# Patient Record
Sex: FEMALE | Race: WHITE | NOT HISPANIC OR LATINO | Employment: UNEMPLOYED | URBAN - METROPOLITAN AREA
[De-identification: names, ages, dates, MRNs, and addresses within clinical notes are randomized per-mention and may not be internally consistent; named-entity substitution may affect disease eponyms.]

---
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
## 2023-08-08 ENCOUNTER — TELEPHONE (OUTPATIENT)
Age: 23
End: 2023-08-08

## 2023-08-08 NOTE — TELEPHONE ENCOUNTER
Caller:  Beto at [facility]    Doctor: ortho    Reason for call:     Checking if  Referral received.  Confirmed yes    Call back#: n/a

## 2023-08-09 ENCOUNTER — OFFICE VISIT (OUTPATIENT)
Dept: OBGYN CLINIC | Facility: CLINIC | Age: 23
End: 2023-08-09
Payer: OTHER MISCELLANEOUS

## 2023-08-09 ENCOUNTER — HOSPITAL ENCOUNTER (OUTPATIENT)
Dept: RADIOLOGY | Facility: HOSPITAL | Age: 23
Discharge: HOME | End: 2023-08-09
Attending: ORTHOPAEDIC SURGERY
Payer: COMMERCIAL

## 2023-08-09 ENCOUNTER — TELEPHONE (OUTPATIENT)
Age: 23
End: 2023-08-09

## 2023-08-09 VITALS — BODY MASS INDEX: 25.02 KG/M2 | WEIGHT: 159.4 LBS | HEIGHT: 67 IN

## 2023-08-09 DIAGNOSIS — S60.212A CONTUSION OF LEFT WRIST, INITIAL ENCOUNTER: Primary | ICD-10-CM

## 2023-08-09 DIAGNOSIS — M25.532 PAIN IN LEFT WRIST: ICD-10-CM

## 2023-08-09 PROCEDURE — 99203 OFFICE O/P NEW LOW 30 MIN: CPT | Performed by: ORTHOPAEDIC SURGERY

## 2023-08-09 PROCEDURE — 73110 X-RAY EXAM OF WRIST: CPT

## 2023-08-09 NOTE — LETTER
August 9, 2023     Patient: Charlene Hough  YOB: 2000  Date of Visit: 8/9/2023      To Whom it May Concern:    Charlene Hough is under my professional care. Tiesha Thomas was seen in my office on 8/9/2023. Tiesha Thomas may return to work on 8/14. If you have any questions or concerns, please don't hesitate to call.          Sincerely,          Emma Quesada        CC: No Recipients

## 2023-08-09 NOTE — PROGRESS NOTES
[address]  [address]  [facility] [place] [address]  [address]  [id number]  2000    ORTHOPAEDIC SURGERY OUTPATIENT NOTE  8/9/2023      HISTORY:  23 y.o. female  Presents for initial evaluation of her left wrist. She reports 4 days ago she had a collapsible ladder fall onto her hand and wrist. 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 This is a work related injury. [facility] and told she had a fracture and was placed in a splint. She denies numbness or tingling in the hand. History reviewed. No pertinent past medical history. History reviewed. No pertinent surgical history. Social History     Socioeconomic History   • Marital status: Single     Spouse name: Not on file   • Number of children: Not on file   • Years of education: Not on file   • Highest education level: Not on file   Occupational History   • Not on file   Tobacco Use   • Smoking status: Never   • Smokeless tobacco: Never   Vaping Use   • Vaping Use: Never used   Substance and Sexual Activity   • Alcohol use: Never   • Drug use: Never   • Sexual activity: Not on file   Other Topics Concern   • Not on file   Social History Narrative   • Not on file     Social Determinants of Health     Financial Resource Strain: Not on file   Food Insecurity: Not on file   Transportation Needs: Not on file   Physical Activity: Not on file   Stress: Not on file   Social Connections: Not on file   Intimate Partner Violence: Not on file   Housing Stability: Not on file       History reviewed. No pertinent family history.      Patient's Medications   New Prescriptions    No medications on file   Previous Medications    CETIRIZINE-PSEUDOEPHEDRINE (ZYRTEC-D) 5-120 MG PER TABLET    Take 1 tablet by mouth 2 (two) times a day    CLINDAMYCIN-BENZOYL PEROXIDE (BENZACLIN) GEL    1 (ONE) APPLICATION DAILY    DOXYCYCLINE HYCLATE (VIBRA-TABS) 100 MG TABLET    Take 100 mg by mouth daily    FLUTICASONE (FLONASE) 50 MCG/ACT NASAL SPRAY    2 sprays into each nostril daily    JUNEL FE 1/20 1-20 MG-MCG PER TABLET        TRETINOIN (RETIN-A) 0.01 % GEL    1 (ONE) APPLICATION AT BEDTIME   Modified Medications    No medications on file   Discontinued Medications    No medications on file       No Known Allergies     Ht 5' 7" (1.702 m) Comment: verbal  Wt 72.3 kg (159 lb 6.4 oz)   BMI 24.97 kg/m²      REVIEW OF SYSTEMS:  Constitutional: Negative. HEENT: Negative. Respiratory: Negative. Skin: Negative. Neurological: Negative. Psychiatric/Behavioral: Negative. Musculoskeletal: Negative except for that mentioned in the HPI. Ht 5' 7" (1.702 m) Comment: verbal  Wt 72.3 kg (159 lb 6.4 oz)   BMI 24.97 kg/m²   Gen: No acute distress, resting comfortably in bed  HEENT: Eyes clear, moist mucus membranes, hearing intact  Respiratory: No audible wheezing or stridor  Cardiovascular: Well Perfused peripherally, 2+ distal pulse  Abdomen: nondistended, no peritoneal signs     PHYSICAL EXAM:    Left wrist:  TTP Across the dorsum of the wrist  TTP distal radius  Wrist ROM:  Flexion 50  Extension 30  Supination and pronation full     strength intact. IMAGING: X-ray of the left wrist was taken in the office today. This was reviewed and demonstrates no acute fracture or subluxation. No significant degenerative changes. ASSESSMENT AND PLAN:  23 y.o. female with left wrist contusion. This is a work-related injury. She will be placed in a cock up wrist brace. Imaging and exam findings were discussed with the patient. She will return to work next week. There was a referral for hand therapy placed if she has stiffness and needs to work on range of motion. Will see her back as needed.     Scribe Attestation      I,:  Kailash Sprague PA-C am acting as a scribe while in the presence of the attending physician.:       I,:  Emma Quesada personally performed the services described in this documentation    as scribed in my presence.:

## 2023-08-09 NOTE — TELEPHONE ENCOUNTER
Caller: [email or web address]    Doctor: Chet Daniel    Reason for call: called to confirm if patient was seen    Call back#: n/a

## 2023-08-09 NOTE — TELEPHONE ENCOUNTER
Employer Name: [employer]  Address: [place] [id number]  Phone number: [phone]    [address]: [facility]   Address: [address] [place] [address]  Phone number: [phone]    [facility] Name: Keegan Richardson  Phone number: [phone] ext [phone]  Fax number: [phone]    Claim #: [id number]  DOI: 8/5/23  Location of injury: Left wrist

## 2024-01-23 ENCOUNTER — OFFICE VISIT (OUTPATIENT)
Dept: URGENT CARE | Facility: CLINIC | Age: 24
End: 2024-01-23
Payer: COMMERCIAL

## 2024-01-23 VITALS
OXYGEN SATURATION: 100 % | RESPIRATION RATE: 18 BRPM | HEART RATE: 94 BPM | DIASTOLIC BLOOD PRESSURE: 72 MMHG | SYSTOLIC BLOOD PRESSURE: 114 MMHG | TEMPERATURE: 97.1 F

## 2024-01-23 DIAGNOSIS — R31.9 URINARY TRACT INFECTION WITH HEMATURIA, SITE UNSPECIFIED: Primary | ICD-10-CM

## 2024-01-23 DIAGNOSIS — N39.0 URINARY TRACT INFECTION WITH HEMATURIA, SITE UNSPECIFIED: Primary | ICD-10-CM

## 2024-01-23 LAB
SL AMB  POCT GLUCOSE, UA: NEGATIVE
SL AMB LEUKOCYTE ESTERASE,UA: ABNORMAL
SL AMB POCT BILIRUBIN,UA: NEGATIVE
SL AMB POCT BLOOD,UA: ABNORMAL
SL AMB POCT CLARITY,UA: CLEAR
SL AMB POCT COLOR,UA: YELLOW
SL AMB POCT KETONES,UA: NEGATIVE
SL AMB POCT NITRITE,UA: NEGATIVE
SL AMB POCT PH,UA: 6
SL AMB POCT SPECIFIC GRAVITY,UA: 1.01
SL AMB POCT URINE HCG: NEGATIVE
SL AMB POCT URINE PROTEIN: ABNORMAL
SL AMB POCT UROBILINOGEN: 0.2

## 2024-01-23 PROCEDURE — 87086 URINE CULTURE/COLONY COUNT: CPT | Performed by: PHYSICIAN ASSISTANT

## 2024-01-23 PROCEDURE — 87186 SC STD MICRODIL/AGAR DIL: CPT | Performed by: PHYSICIAN ASSISTANT

## 2024-01-23 PROCEDURE — 87077 CULTURE AEROBIC IDENTIFY: CPT | Performed by: PHYSICIAN ASSISTANT

## 2024-01-23 PROCEDURE — 81002 URINALYSIS NONAUTO W/O SCOPE: CPT | Performed by: PHYSICIAN ASSISTANT

## 2024-01-23 PROCEDURE — 99213 OFFICE O/P EST LOW 20 MIN: CPT | Performed by: PHYSICIAN ASSISTANT

## 2024-01-23 PROCEDURE — 81025 URINE PREGNANCY TEST: CPT | Performed by: PHYSICIAN ASSISTANT

## 2024-01-23 RX ORDER — DROSPIRENONE AND ETHINYL ESTRADIOL 0.02-3(28)
KIT ORAL EVERY 24 HOURS
COMMUNITY

## 2024-01-23 RX ORDER — CEPHALEXIN 500 MG/1
500 CAPSULE ORAL EVERY 12 HOURS SCHEDULED
Qty: 10 CAPSULE | Refills: 0 | Status: SHIPPED | OUTPATIENT
Start: 2024-01-23 | End: 2024-01-28

## 2024-01-23 RX ORDER — FLUOXETINE 10 MG/1
10 CAPSULE ORAL DAILY
COMMUNITY

## 2024-01-23 NOTE — PATIENT INSTRUCTIONS
Take antibiotic as directed.    Push fluids.    If burning on urination, you may try over the counter Azo Urinary Pain Relief or comparable product.  Please note that this type of product may cause your urine to become bright orange and it may stain your undergarments if you leak.  Please wear protection as needed.      Schedule follow up with your PCP if not improving over next 3-5 days.    If you have recurrent urinary tract problems, please follow up with your PCP and / or urologist for further evaluation.      If you develop worsening symptoms with associated fever, back pain, abdominal pain, nausea with vomiting, please proceed to emergency room for further evaluation.

## 2024-01-23 NOTE — PROGRESS NOTES
[facility]    NAME: Dinora Huertas is a 23 y.o. female  : 2000    MRN: [id number]  DATE: 2024  TIME: 7:32 AM    Assessment and Plan   Urinary tract infection with hematuria, site unspecified [N39.0, R31.9]  1. Urinary tract infection with hematuria, site unspecified  POCT urine dip    POCT urine HCG    Urine culture    cephalexin (KEFLEX) 500 mg capsule          Patient Instructions     Patient Instructions   Take antibiotic as directed.    Push fluids.    If burning on urination, you may try over the counter Azo Urinary Pain Relief or comparable product.  Please note that this type of product may cause your urine to become bright orange and it may stain your undergarments if you leak.  Please wear protection as needed.      Schedule follow up with your PCP if not improving over next 3-5 days.    If you have recurrent urinary tract problems, please follow up with your PCP and / or urologist for further evaluation.      If you develop worsening symptoms with associated fever, back pain, abdominal pain, nausea with vomiting, please proceed to emergency room for further evaluation.           Chief Complaint     Chief Complaint   Patient presents with    Possible UTI     Patient states that she woke up this am with blood in urine and dysuria with bilat flank pain       History of Present Illness   Dinora Huertas presents to the clinic c/o  23-year-old female comes in with complaint of burning on urination.    Started: Patient woke up this morning with low back pressure, suprapubic pressure and burning on urination with frequency.  When wiping she noted little bit of blood.  Associated signs and symptoms: Initially nauseated but that has passed.  No fever or chills.  No flank pain.  Modifying factors: Came here for further evaluation.    Will be leaving this next Monday to study in [employer] for the ester.  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      No prior history of urinary tract  infections.    She is sexually active.  Denies any abnormal vaginal discharge, vaginal pain.          Review of Systems   Review of Systems   Constitutional: Negative.    Respiratory: Negative.     Cardiovascular: Negative.    Gastrointestinal:  Positive for abdominal pain and nausea. Negative for vomiting.        Nauseated earlier this morning but that is passed.   Genitourinary:  Positive for difficulty urinating, dysuria, frequency, hematuria and urgency. Negative for flank pain, genital sores, vaginal bleeding, vaginal discharge and vaginal pain.       Current Medications     Long-Term Medications   Medication Sig Dispense Refill    clindamycin-benzoyl peroxide (BENZACLIN) gel 1 (ONE) APPLICATION DAILY (Patient not taking: No sig reported)  2    drospirenone-ethinyl estradiol (CHARLES) 3-0.02 MG per tablet every 24 hours (Patient not taking: Reported on 1/23/2024)      FLUoxetine (PROzac) 10 mg capsule Take 10 mg by mouth daily      fluticasone (FLONASE) 50 mcg/act nasal spray 2 sprays into each nostril daily 16 g 0    Junel FE 1/20 1-20 MG-MCG per tablet  (Patient not taking: Reported on 1/23/2024)      tretinoin (RETIN-A) 0.01 % gel 1 (ONE) APPLICATION AT BEDTIME (Patient not taking: No sig reported)  3       Current Allergies     Allergies as of 01/23/2024    (No Known Allergies)          The following portions of the patient's history were reviewed and updated as appropriate: allergies, current medications, past family history, past medical history, past social history, past surgical history and problem list.  History reviewed. No pertinent past medical history.  History reviewed. No pertinent surgical history.  History reviewed. No pertinent family history.    Objective   /72   Pulse 94   Temp (!) 97.1 °F (36.2 °C) (Tympanic)   Resp 18   LMP 12/14/2023   SpO2 100%   Patient's last menstrual period was 12/14/2023.       Physical Exam     Physical Exam  Vitals and nursing note reviewed.    Constitutional:       General: She is not in acute distress.     Appearance: She is well-developed. She is not ill-appearing, toxic-appearing or diaphoretic.   Cardiovascular:      Rate and Rhythm: Normal rate and regular rhythm.      Heart sounds: Normal heart sounds. No murmur heard.     No friction rub. No gallop.   Pulmonary:      Effort: Pulmonary effort is normal. No respiratory distress.      Breath sounds: Normal breath sounds. No stridor. No wheezing, rhonchi or rales.   Abdominal:      Palpations: Abdomen is soft.      Tenderness: There is abdominal tenderness. There is no right CVA tenderness, left CVA tenderness, guarding or rebound.      Comments: Suprapubic TTP   Skin:     General: Skin is warm and dry.   Neurological:      General: No focal deficit present.      Mental Status: She is alert and oriented to person, place, and time.   Psychiatric:         Mood and Affect: Mood normal.         Behavior: Behavior normal.

## 2024-01-25 LAB
BACTERIA UR CULT: ABNORMAL
BACTERIA UR CULT: ABNORMAL

## 2024-05-20 ENCOUNTER — OFFICE VISIT (OUTPATIENT)
Dept: URGENT CARE | Facility: CLINIC | Age: 24
End: 2024-05-20
Payer: COMMERCIAL

## 2024-05-20 VITALS
DIASTOLIC BLOOD PRESSURE: 66 MMHG | WEIGHT: 170 LBS | SYSTOLIC BLOOD PRESSURE: 112 MMHG | RESPIRATION RATE: 18 BRPM | BODY MASS INDEX: 26.63 KG/M2 | HEART RATE: 94 BPM | OXYGEN SATURATION: 99 % | TEMPERATURE: 97.7 F

## 2024-05-20 DIAGNOSIS — R30.0 DYSURIA: Primary | ICD-10-CM

## 2024-05-20 DIAGNOSIS — N30.01 ACUTE CYSTITIS WITH HEMATURIA: ICD-10-CM

## 2024-05-20 LAB
SL AMB  POCT GLUCOSE, UA: ABNORMAL
SL AMB LEUKOCYTE ESTERASE,UA: ABNORMAL
SL AMB POCT BILIRUBIN,UA: ABNORMAL
SL AMB POCT BLOOD,UA: ABNORMAL
SL AMB POCT CLARITY,UA: ABNORMAL
SL AMB POCT COLOR,UA: ABNORMAL
SL AMB POCT KETONES,UA: ABNORMAL
SL AMB POCT NITRITE,UA: NEGATIVE
SL AMB POCT PH,UA: 6
SL AMB POCT SPECIFIC GRAVITY,UA: 1.03
SL AMB POCT URINE HCG: NEGATIVE
SL AMB POCT URINE PROTEIN: ABNORMAL
SL AMB POCT UROBILINOGEN: 0.2

## 2024-05-20 PROCEDURE — 81025 URINE PREGNANCY TEST: CPT | Performed by: NURSE PRACTITIONER

## 2024-05-20 PROCEDURE — G0382 LEV 3 HOSP TYPE B ED VISIT: HCPCS | Performed by: NURSE PRACTITIONER

## 2024-05-20 PROCEDURE — S9083 URGENT CARE CENTER GLOBAL: HCPCS | Performed by: NURSE PRACTITIONER

## 2024-05-20 PROCEDURE — 81002 URINALYSIS NONAUTO W/O SCOPE: CPT | Performed by: NURSE PRACTITIONER

## 2024-05-20 PROCEDURE — 87086 URINE CULTURE/COLONY COUNT: CPT | Performed by: NURSE PRACTITIONER

## 2024-05-20 PROCEDURE — 87077 CULTURE AEROBIC IDENTIFY: CPT | Performed by: NURSE PRACTITIONER

## 2024-05-20 PROCEDURE — 87186 SC STD MICRODIL/AGAR DIL: CPT | Performed by: NURSE PRACTITIONER

## 2024-05-20 RX ORDER — NITROFURANTOIN 25; 75 MG/1; MG/1
100 CAPSULE ORAL 2 TIMES DAILY
Qty: 14 CAPSULE | Refills: 0 | Status: SHIPPED | OUTPATIENT
Start: 2024-05-20 | End: 2024-05-27

## 2024-05-20 NOTE — PATIENT INSTRUCTIONS
Urinary Tract Infection in Women   AMBULATORY CARE:   A urinary tract infection (UTI)  is caused by bacteria that get inside your urinary tract. Your urinary tract includes your kidneys, ureters, bladder, and urethra. A UTI is more common in your lower urinary tract, which includes your bladder and urethra.       Common symptoms include the following:   Urinating more often or waking from sleep to urinate    Pain or burning when you urinate    Pain or pressure in your lower abdomen and back    Urine that smells bad    Blood in your urine    Leaking urine    Seek care immediately if:   You are urinating very little or not at all.    You have a high fever with shaking chills.    You have side or back pain that gets worse.    Call your doctor if:   You have a fever.    You do not feel better after 2 days of taking antibiotics.    You have new symptoms, such as blood or pus in your urine.    You are vomiting.    You have questions or concerns about your condition or care.    Treatment for a UTI  may include antibiotics to treat a bacterial infection. You may also need medicines to decrease pain and burning, or decrease the urge to urinate often. If you have UTIs often (called recurrent UTIs), you may be given antibiotics to take regularly. You will be given directions for when and how to use antibiotics. The goal is to prevent UTIs but not cause antibiotic resistance by using antibiotics too often.  Prevent a UTI:   Empty your bladder often.  Urinate and empty your bladder as soon as you feel the need. Do not hold your urine for long periods of time.    Wipe from front to back after you urinate or have a bowel movement.  This will help prevent germs from getting into your urinary tract through your urethra.    Drink liquids as directed.  Ask how much liquid to drink each day and which liquids are best for you. You may need to drink more liquids than usual to help flush out the bacteria. Do not drink alcohol, caffeine,  or citrus juices. These can irritate your bladder and increase your symptoms. Your healthcare provider may recommend cranberry juice to help prevent a UTI.    Urinate before and after you have sex.  This can help flush out bacteria passed during sex.    Do not douche or use feminine deodorants.  These can change the chemical balance in your vagina.    Change sanitary pads or tampons often.  This will help prevent germs from getting into your urinary tract.    Talk to your healthcare provider about your birth control method.  You may need to change your method if it is increasing your risk for UTIs.    Wear cotton underwear and clothes that are loose.  Tight pants and nylon underwear can trap moisture and cause bacteria to grow.    Vaginal estrogen may be recommended.  This medicine helps prevent UTIs in women who have gone through menopause or are in scarlet-menopause.    Do pelvic muscle exercises often.  Pelvic muscle exercises may help you start and stop urinating. Strong pelvic muscles may help you empty your bladder easier. Squeeze these muscles tightly for 5 seconds like you are trying to hold back urine. Then relax for 5 seconds. Gradually work up to squeezing for 10 seconds. Do 3 sets of 15 repetitions a day, or as directed.    Follow up with your doctor as directed:  Write down your questions so you remember to ask them during your visits.  © Copyright [employer] 2023 Information is for End User's use only and may not be sold, redistributed or otherwise used for commercial purposes.  The above information is an  only. It is not intended as medical advice for individual conditions or treatments. Talk to your doctor, nurse or pharmacist before following any medical regimen to see if it is safe and effective for you.

## 2024-05-20 NOTE — PROGRESS NOTES
[facility]        NAME: Dinora Huertas is a 23 y.o. female  : 2000    MRN: [id number]  DATE: May 20, 2024  TIME: 2:46 PM    Assessment and Plan   Dysuria [R30.0]  1. Dysuria  POCT urine dip    POCT urine HCG    Urine culture    Urine culture    nitrofurantoin (MACROBID) 100 mg capsule      2. Acute cystitis with hematuria          Ua pos for blood and leuks   Will send culture for sensitivity   Start course of macrobid   F/u with pcp   Pt in agreement with plan     Patient Instructions     Follow up with PCP in 3-5 days.  Proceed to  ER if symptoms worsen.    Chief Complaint     Chief Complaint   Patient presents with    Possible UTI     Started 2 days ago with blood in urine, dysuria, lower abd discomfort         History of Present Illness   Dinora Huertas presents to the clinic c/o    Possible UTI (Started 2 days ago with blood in urine, dysuria, lower abd discomfort)  Started otc pyridium for symptom management   Does try to void after relations everytime.        Review of Systems   Review of Systems   All other systems reviewed and are negative.        Current Medications     Long-Term Medications   Medication Sig Dispense Refill    FLUoxetine (PROzac) 10 mg capsule Take 10 mg by mouth daily      clindamycin-benzoyl peroxide (BENZACLIN) gel 1 (ONE) APPLICATION DAILY (Patient not taking: No sig reported)  2    drospirenone-ethinyl estradiol (CHARLES) 3-0.02 MG per tablet every 24 hours (Patient not taking: Reported on 2024)      fluticasone (FLONASE) 50 mcg/act nasal spray 2 sprays into each nostril daily 16 g 0    Junel FE  1-20 MG-MCG per tablet  (Patient not taking: Reported on 2024)      tretinoin (RETIN-A) 0.01 % gel 1 (ONE) APPLICATION AT BEDTIME (Patient not taking: No sig reported)  3       Current Allergies     Allergies as of 2024    (No Known Allergies)            The following portions of the patient's history were reviewed and updated as appropriate:  allergies, current medications, past family history, past medical history, past social history, past surgical history and problem list.    Objective   /66   Pulse 94   Temp 97.7 °F (36.5 °C) (Tympanic)   Resp 18   Wt 77.1 kg (170 lb)   LMP 05/13/2024 (Approximate)   SpO2 99%   BMI 26.63 kg/m²        Physical Exam     Physical Exam  Vitals and nursing note reviewed.   Constitutional:       Appearance: Normal appearance. She is well-developed.   HENT:      Head: Normocephalic and atraumatic.      Right Ear: Hearing normal.      Left Ear: Hearing normal.      Nose: Nose normal.      Mouth/Throat:      Lips: Pink.      Mouth: Mucous membranes are moist.      Pharynx: Oropharynx is clear.   Eyes:      General: Lids are normal.      Conjunctiva/sclera: Conjunctivae normal.      Pupils: Pupils are equal, round, and reactive to light.   Cardiovascular:      Rate and Rhythm: Normal rate and regular rhythm.      Heart sounds: Normal heart sounds, S1 normal and S2 normal.   Pulmonary:      Effort: Pulmonary effort is normal.      Breath sounds: Normal breath sounds.   Abdominal:      General: Abdomen is flat. Bowel sounds are normal.      Palpations: Abdomen is soft.      Tenderness: There is abdominal tenderness in the suprapubic area. There is no right CVA tenderness or left CVA tenderness.   Musculoskeletal:         General: Normal range of motion.      Cervical back: Full passive range of motion without pain, normal range of motion and neck supple.   Skin:     General: Skin is warm and dry.   Neurological:      General: No focal deficit present.      Mental Status: She is alert and oriented to person, place, and time.   Psychiatric:         Mood and Affect: Mood normal.         Speech: Speech normal.         Behavior: Behavior normal. Behavior is cooperative.         Thought Content: Thought content normal.         Judgment: Judgment normal.

## 2024-05-22 LAB
BACTERIA UR CULT: ABNORMAL
BACTERIA UR CULT: ABNORMAL

## 2025-06-27 ENCOUNTER — APPOINTMENT (OUTPATIENT)
Dept: LAB | Facility: CLINIC | Age: 25
End: 2025-06-27
Payer: COMMERCIAL

## 2025-06-27 DIAGNOSIS — N92.0 EXCESSIVE OR FREQUENT MENSTRUATION: ICD-10-CM

## 2025-06-27 LAB
BASOPHILS # BLD AUTO: 0.07 THOUSANDS/ÂΜL (ref 0–0.1)
BASOPHILS NFR BLD AUTO: 1 % (ref 0–1)
EOSINOPHIL # BLD AUTO: 0.1 THOUSAND/ÂΜL (ref 0–0.61)
EOSINOPHIL NFR BLD AUTO: 2 % (ref 0–6)
ERYTHROCYTE [DISTWIDTH] IN BLOOD BY AUTOMATED COUNT: 13 % (ref 11.6–15.1)
HCT VFR BLD AUTO: 44.2 % (ref 34.8–46.1)
HGB BLD-MCNC: 14.6 G/DL (ref 11.5–15.4)
IMM GRANULOCYTES # BLD AUTO: 0.02 THOUSAND/UL (ref 0–0.2)
IMM GRANULOCYTES NFR BLD AUTO: 0 % (ref 0–2)
LYMPHOCYTES # BLD AUTO: 1.79 THOUSANDS/ÂΜL (ref 0.6–4.47)
LYMPHOCYTES NFR BLD AUTO: 27 % (ref 14–44)
MCH RBC QN AUTO: 29.5 PG (ref 26.8–34.3)
MCHC RBC AUTO-ENTMCNC: 33 G/DL (ref 31.4–37.4)
MCV RBC AUTO: 89 FL (ref 82–98)
MONOCYTES # BLD AUTO: 0.42 THOUSAND/ÂΜL (ref 0.17–1.22)
MONOCYTES NFR BLD AUTO: 6 % (ref 4–12)
NEUTROPHILS # BLD AUTO: 4.32 THOUSANDS/ÂΜL (ref 1.85–7.62)
NEUTS SEG NFR BLD AUTO: 64 % (ref 43–75)
NRBC BLD AUTO-RTO: 0 /100 WBCS
PLATELET # BLD AUTO: 275 THOUSANDS/UL (ref 149–390)
PMV BLD AUTO: 11.4 FL (ref 8.9–12.7)
RBC # BLD AUTO: 4.95 MILLION/UL (ref 3.81–5.12)
TSH SERPL DL<=0.05 MIU/L-ACNC: 1.64 UIU/ML (ref 0.45–4.5)
WBC # BLD AUTO: 6.72 THOUSAND/UL (ref 4.31–10.16)

## 2025-06-27 PROCEDURE — 84443 ASSAY THYROID STIM HORMONE: CPT

## 2025-06-27 PROCEDURE — 36415 COLL VENOUS BLD VENIPUNCTURE: CPT

## 2025-06-27 PROCEDURE — 85025 COMPLETE CBC W/AUTO DIFF WBC: CPT
